# Patient Record
Sex: MALE | Race: AMERICAN INDIAN OR ALASKA NATIVE
[De-identification: names, ages, dates, MRNs, and addresses within clinical notes are randomized per-mention and may not be internally consistent; named-entity substitution may affect disease eponyms.]

---

## 2019-06-03 ENCOUNTER — HOSPITAL ENCOUNTER (EMERGENCY)
Dept: HOSPITAL 5 - ED | Age: 39
Discharge: HOME | End: 2019-06-03
Payer: SELF-PAY

## 2019-06-03 VITALS — SYSTOLIC BLOOD PRESSURE: 133 MMHG | DIASTOLIC BLOOD PRESSURE: 63 MMHG

## 2019-06-03 DIAGNOSIS — J01.90: Primary | ICD-10-CM

## 2019-06-03 DIAGNOSIS — F17.200: ICD-10-CM

## 2019-06-03 NOTE — EMERGENCY DEPARTMENT REPORT
- General


Chief Complaint: Upper Respiratory Infection


Stated Complaint: SINUS INFECTION


Time Seen by Provider: 06/03/19 08:46


Source: patient


Mode of arrival: Ambulatory


Limitations: No Limitations





- History of Present Illness


Initial Comments: 





Mr. Caba is a 38-year-old male who presents with "really bad sinus infection".  

He has right maxillary pain to his pain.  Ear Pressure.  He has nasal drainage 

from the right nostril.  Associated sinus pressure headache.


MD Complaint: nasal congestion, sinus pain


-: Gradual, days(s) (2)


Severity: severe


Quality: aching


Consistency: constant


Improves With: nothing


Worsens With: activity





- Related Data


                                  Previous Rx's











 Medication  Instructions  Recorded  Last Taken  Type


 


Amoxicillin [Amoxicillin TAB] 875 mg PO BID 10 Days #20 tablet 06/03/19 Unknown 

Rx


 


Ibuprofen [Motrin 800 MG tab] 800 mg PO Q8HR PRN #10 tablet 06/03/19 Unknown Rx


 


Loratadine 10 mg PO DAILY 30 Days #30 capsule 06/03/19 Unknown Rx











                                    Allergies











Allergy/AdvReac Type Severity Reaction Status Date / Time


 


No Known Allergies Allergy   Unverified 06/03/19 08:28














ED Review of Systems


ROS: 


Stated complaint: SINUS INFECTION


Other details as noted in HPI





Constitutional: malaise


ENT: ear pain, dental pain, congestion


Neurological: headache





ED Past Medical Hx





- Past Medical History


Previous Medical History?: No





- Surgical History


Past Surgical History?: No





- Social History


Smoking Status: Current Every Day Smoker


Substance Use Type: Alcohol





- Medications


Home Medications: 


                                Home Medications











 Medication  Instructions  Recorded  Confirmed  Last Taken  Type


 


Amoxicillin [Amoxicillin TAB] 875 mg PO BID 10 Days #20 tablet 06/03/19  Unknown

Rx


 


Ibuprofen [Motrin 800 MG tab] 800 mg PO Q8HR PRN #10 tablet 06/03/19  Unknown Rx


 


Loratadine 10 mg PO DAILY 30 Days #30 capsule 06/03/19  Unknown Rx














ED Physical Exam





- General


Limitations: No Limitations


General appearance: alert, in no apparent distress





- Head


Head exam: Present: atraumatic, normocephalic





- Eye


Eye exam: Present: normal appearance.  Absent: scleral icterus, conjunctival 

injection





- ENT


ENT exam: Present: mucous membranes moist





- Neck


Neck exam: Present: normal inspection





- Respiratory


Respiratory exam: Present: normal lung sounds bilaterally.  Absent: respiratory 

distress





- Cardiovascular


Cardiovascular Exam: Present: regular rate, normal rhythm, normal heart sounds. 

Absent: systolic murmur, diastolic murmur, rubs, gallop





- GI/Abdominal


GI/Abdominal exam: Present: soft, normal bowel sounds





- Rectal


Rectal exam: Present: deferred





- Extremities Exam


Extremities exam: Present: normal inspection





- Back Exam


Back exam: Present: normal inspection





- Neurological Exam


Neurological exam: Present: alert, oriented X3





- Psychiatric


Psychiatric exam: Present: normal affect, normal mood





- Skin


Skin exam: Present: warm, dry, intact, normal color.  Absent: rash





ED Course





                                   Vital Signs











  06/03/19





  08:30


 


Temperature 98.6 F


 


Pulse Rate 75


 


Respiratory 16





Rate 


 


Blood Pressure 133/63


 


O2 Sat by Pulse 99





Oximetry 














ED Medical Decision Making





- Medical Decision Making





Mr. Caba has clinical diagnosis of acute maxillary sinusitis.  Due to severity 

of symptoms, antibiotics are indicated.





due to lack of insurance, Amoxicillin provided instead of first line therapy 

Augmentin





Additional prescriptions ibuprofen and loratadine.


Critical care attestation.: 


If time is entered above; I have spent that time in minutes in the direct care 

of this critically ill patient, excluding procedure time.








ED Disposition


Clinical Impression: 


 Acute sinusitis





Disposition: DC-01 TO HOME OR SELFCARE


Is pt being admited?: No


Does the pt Need Aspirin: No


Condition: Stable


Instructions:  Sinusitis (ED)


Prescriptions: 


Amoxicillin [Amoxicillin TAB] 875 mg PO BID 10 Days #20 tablet


Loratadine 10 mg PO DAILY 30 Days #30 capsule


Ibuprofen [Motrin 800 MG tab] 800 mg PO Q8HR PRN #10 tablet


 PRN Reason: Pain , Severe (7-10)

## 2019-06-22 ENCOUNTER — HOSPITAL ENCOUNTER (EMERGENCY)
Dept: HOSPITAL 5 - ED | Age: 39
Discharge: LEFT BEFORE DISCHARGE | End: 2019-06-22
Payer: SELF-PAY

## 2019-06-22 VITALS — DIASTOLIC BLOOD PRESSURE: 56 MMHG | SYSTOLIC BLOOD PRESSURE: 119 MMHG

## 2019-06-22 DIAGNOSIS — R51: Primary | ICD-10-CM

## 2019-06-22 DIAGNOSIS — Z53.21: ICD-10-CM

## 2019-07-13 ENCOUNTER — HOSPITAL ENCOUNTER (EMERGENCY)
Dept: HOSPITAL 5 - ED | Age: 39
Discharge: LEFT BEFORE DISCHARGE | End: 2019-07-13
Payer: SELF-PAY

## 2019-07-13 VITALS — DIASTOLIC BLOOD PRESSURE: 65 MMHG | SYSTOLIC BLOOD PRESSURE: 129 MMHG

## 2019-07-13 DIAGNOSIS — R51: Primary | ICD-10-CM

## 2019-07-13 DIAGNOSIS — Z53.21: ICD-10-CM
